# Patient Record
Sex: MALE | Race: WHITE | Employment: FULL TIME | URBAN - METROPOLITAN AREA
[De-identification: names, ages, dates, MRNs, and addresses within clinical notes are randomized per-mention and may not be internally consistent; named-entity substitution may affect disease eponyms.]

---

## 2017-01-10 ENCOUNTER — APPOINTMENT (OUTPATIENT)
Dept: GENERAL RADIOLOGY | Facility: HOSPITAL | Age: 57
End: 2017-01-10
Payer: COMMERCIAL

## 2017-01-10 ENCOUNTER — HOSPITAL ENCOUNTER (EMERGENCY)
Facility: HOSPITAL | Age: 57
Discharge: HOME | End: 2017-01-10
Attending: EMERGENCY MEDICINE
Payer: COMMERCIAL

## 2017-01-10 VITALS
BODY MASS INDEX: 42.66 KG/M2 | HEIGHT: 72 IN | SYSTOLIC BLOOD PRESSURE: 131 MMHG | HEART RATE: 76 BPM | RESPIRATION RATE: 17 BRPM | DIASTOLIC BLOOD PRESSURE: 82 MMHG | OXYGEN SATURATION: 99 % | WEIGHT: 315 LBS | TEMPERATURE: 98 F

## 2017-01-10 DIAGNOSIS — S80.01XA CONTUSION OF RIGHT KNEE, INITIAL ENCOUNTER: Primary | ICD-10-CM

## 2017-01-10 PROCEDURE — 73560 X-RAY EXAM OF KNEE 1 OR 2: CPT

## 2017-01-10 PROCEDURE — 99283 EMERGENCY DEPT VISIT LOW MDM: CPT

## 2017-01-10 NOTE — ED PROVIDER NOTES
Patient Seen in: [facility] AND [facility] Emergency Department    History   Patient presents with:  Fall (musculoskeletal, neurologic)    Stated Complaint: fall    HPI    76-year-old male who is morbidly obese and does not have any medical problems by report an round, and reactive to light. Neck: Normal range of motion. Neck supple. Cardiovascular: Normal rate, regular rhythm and intact distal pulses. Pulmonary/Chest: Effort normal. No respiratory distress. Abdominal: Soft. There is no tenderness.  There call them if he has continued symptoms. His extensor tendon mechanism is clearly intact here as he has full extension from a flexed position.       Disposition and Plan     Clinical Impression:  Contusion of right knee, initial encounter  (primary encounte

## 2017-01-10 NOTE — ED INITIAL ASSESSMENT (HPI)
Patient slipped and fell on ice and landed on knees. C/o right knee pain. Cms and rom intact. Denies loc and hitting head.

## (undated) NOTE — ED AVS SNAPSHOT
[facility] Emergency Department    [address]     [address] [place] [address]    Phone:  [phone]    Fax:  [phone]           Pranay Mckinley   MRN: [id number]    Department:  [facility] Emergency Department   Date of Visit: to serve you. You are our top priority. You were examined and treated today on an urgent basis only. This was not a substitute for ongoing medical care. Often, one Emergency Department visit does not uncover every injury or illness.  If you have been r [facility]        Pharmacy Address Phone Number   Terry [facility] [address] [facility] ([address]) [address] [facility] [place] [address]) [phone]   [facility] [address]  ([address] [facility]) [phone] If you have questions, you can call [phone] to talk to our [facility] Staff. Remember, Department of Health and Human Serviceshart is NOT to be used for urgent needs. For medical emergencies, dial 911.

## (undated) NOTE — ED AVS SNAPSHOT
[facility] Emergency Department    [address]     [address] [place] [address]    Phone:  [phone]    Fax:  [phone]           Quang Timmons   MRN: [id number]    Department:  [facility] Emergency Department   Date of Visit: and Class Registration line at [phone] or find a doctor online by visiting [email or web address].    IF THERE IS ANY CHANGE OR WORSENING OF YOUR CONDITION, CALL YOUR PRIMARY CARE PHYSICIAN AT ONCE OR RETURN IMMEDIATELY TO [address] [place] [address].     If